# Patient Record
Sex: MALE | Race: WHITE | NOT HISPANIC OR LATINO | Employment: FULL TIME | URBAN - METROPOLITAN AREA
[De-identification: names, ages, dates, MRNs, and addresses within clinical notes are randomized per-mention and may not be internally consistent; named-entity substitution may affect disease eponyms.]

---

## 2017-01-13 ENCOUNTER — OFFICE VISIT - HEALTHEAST (OUTPATIENT)
Dept: SURGERY | Facility: CLINIC | Age: 57
End: 2017-01-13

## 2017-01-13 DIAGNOSIS — Z71.3 DIETARY COUNSELING: ICD-10-CM

## 2017-01-13 DIAGNOSIS — E66.01 OBESITY, MORBID, BMI 40.0-49.9 (H): ICD-10-CM

## 2017-01-13 ASSESSMENT — MIFFLIN-ST. JEOR: SCORE: 2510.43

## 2017-01-17 ENCOUNTER — AMBULATORY - HEALTHEAST (OUTPATIENT)
Dept: SURGERY | Facility: CLINIC | Age: 57
End: 2017-01-17

## 2017-01-31 ENCOUNTER — OFFICE VISIT - HEALTHEAST (OUTPATIENT)
Dept: SURGERY | Facility: CLINIC | Age: 57
End: 2017-01-31

## 2017-01-31 ENCOUNTER — AMBULATORY - HEALTHEAST (OUTPATIENT)
Dept: SURGERY | Facility: CLINIC | Age: 57
End: 2017-01-31

## 2017-01-31 DIAGNOSIS — G47.33 OSA (OBSTRUCTIVE SLEEP APNEA): ICD-10-CM

## 2017-01-31 DIAGNOSIS — Z98.84 BARIATRIC SURGERY STATUS: ICD-10-CM

## 2017-01-31 DIAGNOSIS — E66.01 OBESITY, CLASS III, BMI 40-49.9 (MORBID OBESITY) (H): ICD-10-CM

## 2017-01-31 DIAGNOSIS — E11.9 TYPE II DIABETES MELLITUS (H): ICD-10-CM

## 2017-01-31 RX ORDER — PEDI MULTIVIT NO.25/FOLIC ACID 300 MCG
1 TABLET,CHEWABLE ORAL 2 TIMES DAILY
Status: SHIPPED | COMMUNITY
Start: 2017-01-31

## 2017-01-31 ASSESSMENT — MIFFLIN-ST. JEOR: SCORE: 2510.43

## 2017-02-01 ENCOUNTER — COMMUNICATION - HEALTHEAST (OUTPATIENT)
Dept: SURGERY | Facility: CLINIC | Age: 57
End: 2017-02-01

## 2017-02-01 ENCOUNTER — AMBULATORY - HEALTHEAST (OUTPATIENT)
Dept: SURGERY | Facility: CLINIC | Age: 57
End: 2017-02-01

## 2017-02-14 ENCOUNTER — AMBULATORY - HEALTHEAST (OUTPATIENT)
Dept: SURGERY | Facility: CLINIC | Age: 57
End: 2017-02-14

## 2017-02-21 ENCOUNTER — AMBULATORY - HEALTHEAST (OUTPATIENT)
Dept: SURGERY | Facility: CLINIC | Age: 57
End: 2017-02-21

## 2017-02-21 ENCOUNTER — HOSPITAL ENCOUNTER (OUTPATIENT)
Dept: CARDIOLOGY | Facility: HOSPITAL | Age: 57
Discharge: HOME | End: 2017-02-21
Attending: ANESTHESIOLOGY

## 2017-02-21 ENCOUNTER — AMBULATORY - HEALTHEAST (OUTPATIENT)
Dept: CARDIOLOGY | Facility: HOSPITAL | Age: 57
End: 2017-02-21

## 2017-02-21 ENCOUNTER — RECORDS - HEALTHEAST (OUTPATIENT)
Dept: ADMINISTRATIVE | Facility: OTHER | Age: 57
End: 2017-02-21

## 2017-02-21 DIAGNOSIS — Z01.818 PRE-OP TESTING: ICD-10-CM

## 2017-02-21 LAB
ATRIAL RATE - MUSE: 78 BPM
DIASTOLIC BLOOD PRESSURE - MUSE: NORMAL MMHG
INTERPRETATION ECG - MUSE: NORMAL
P AXIS - MUSE: 51 DEGREES
PR INTERVAL - MUSE: 136 MS
QRS DURATION - MUSE: 92 MS
QT - MUSE: 416 MS
QTC - MUSE: 474 MS
R AXIS - MUSE: 33 DEGREES
SYSTOLIC BLOOD PRESSURE - MUSE: NORMAL MMHG
T AXIS - MUSE: 29 DEGREES
VENTRICULAR RATE- MUSE: 78 BPM

## 2017-04-19 ENCOUNTER — OFFICE VISIT - HEALTHEAST (OUTPATIENT)
Dept: SURGERY | Facility: CLINIC | Age: 57
End: 2017-04-19

## 2017-04-19 DIAGNOSIS — E66.01 OBESITY, MORBID, BMI 40.0-49.9 (H): ICD-10-CM

## 2017-04-19 DIAGNOSIS — Z71.3 DIETARY COUNSELING: ICD-10-CM

## 2017-04-19 ASSESSMENT — MIFFLIN-ST. JEOR: SCORE: 2539.92

## 2017-07-19 ENCOUNTER — OFFICE VISIT - HEALTHEAST (OUTPATIENT)
Dept: SURGERY | Facility: CLINIC | Age: 57
End: 2017-07-19

## 2017-07-19 DIAGNOSIS — E66.01 OBESITY, MORBID, BMI 40.0-49.9 (H): ICD-10-CM

## 2017-07-19 DIAGNOSIS — Z71.3 DIETARY COUNSELING: ICD-10-CM

## 2017-07-19 ASSESSMENT — MIFFLIN-ST. JEOR: SCORE: 2494.56

## 2017-07-25 ENCOUNTER — AMBULATORY - HEALTHEAST (OUTPATIENT)
Dept: LAB | Facility: CLINIC | Age: 57
End: 2017-07-25

## 2017-07-25 ENCOUNTER — OFFICE VISIT - HEALTHEAST (OUTPATIENT)
Dept: SURGERY | Facility: CLINIC | Age: 57
End: 2017-07-25

## 2017-07-25 DIAGNOSIS — E11.9 TYPE 2 DIABETES MELLITUS (H): ICD-10-CM

## 2017-07-25 DIAGNOSIS — G47.33 OSA (OBSTRUCTIVE SLEEP APNEA): ICD-10-CM

## 2017-07-25 DIAGNOSIS — E66.01 OBESITY, CLASS III, BMI 40-49.9 (MORBID OBESITY) (H): ICD-10-CM

## 2017-07-25 DIAGNOSIS — I10 ESSENTIAL HYPERTENSION: ICD-10-CM

## 2017-07-25 DIAGNOSIS — E11.9 TYPE II DIABETES MELLITUS (H): ICD-10-CM

## 2017-07-25 LAB — HBA1C MFR BLD: 7.4 % (ref 4.2–6.1)

## 2017-07-25 ASSESSMENT — MIFFLIN-ST. JEOR: SCORE: 2521.77

## 2017-07-31 ENCOUNTER — AMBULATORY - HEALTHEAST (OUTPATIENT)
Dept: SURGERY | Facility: CLINIC | Age: 57
End: 2017-07-31

## 2017-08-08 ENCOUNTER — COMMUNICATION - HEALTHEAST (OUTPATIENT)
Dept: SURGERY | Facility: CLINIC | Age: 57
End: 2017-08-08

## 2017-08-17 ENCOUNTER — AMBULATORY - HEALTHEAST (OUTPATIENT)
Dept: SURGERY | Facility: CLINIC | Age: 57
End: 2017-08-17

## 2017-08-17 DIAGNOSIS — E66.01 OBESITY, MORBID, BMI 40.0-49.9 (H): ICD-10-CM

## 2017-08-17 DIAGNOSIS — Z98.84 S/P BARIATRIC SURGERY: ICD-10-CM

## 2017-08-17 DIAGNOSIS — R63.4 RAPID WEIGHT LOSS: ICD-10-CM

## 2017-08-17 DIAGNOSIS — Z71.3 DIETARY COUNSELING: ICD-10-CM

## 2017-08-17 DIAGNOSIS — Z01.818 PRE-OP TESTING: ICD-10-CM

## 2017-08-17 DIAGNOSIS — Z98.84 BARIATRIC SURGERY STATUS: ICD-10-CM

## 2017-08-17 ASSESSMENT — MIFFLIN-ST. JEOR
SCORE: 2594.35
SCORE: 2594.35

## 2017-09-05 ENCOUNTER — ANESTHESIA - HEALTHEAST (OUTPATIENT)
Dept: SURGERY | Facility: CLINIC | Age: 57
End: 2017-09-05

## 2017-09-06 ENCOUNTER — SURGERY - HEALTHEAST (OUTPATIENT)
Dept: SURGERY | Facility: CLINIC | Age: 57
End: 2017-09-06

## 2017-09-06 ASSESSMENT — MIFFLIN-ST. JEOR: SCORE: 2436.15

## 2017-09-08 ENCOUNTER — COMMUNICATION - HEALTHEAST (OUTPATIENT)
Dept: SURGERY | Facility: CLINIC | Age: 57
End: 2017-09-08

## 2017-09-12 ENCOUNTER — AMBULATORY - HEALTHEAST (OUTPATIENT)
Dept: SURGERY | Facility: CLINIC | Age: 57
End: 2017-09-12

## 2017-09-12 DIAGNOSIS — Z71.3 DIETARY COUNSELING: ICD-10-CM

## 2017-09-12 DIAGNOSIS — Z98.84 BARIATRIC SURGERY STATUS: ICD-10-CM

## 2017-09-12 DIAGNOSIS — E66.01 OBESITY, MORBID, BMI 40.0-49.9 (H): ICD-10-CM

## 2017-09-19 ENCOUNTER — OFFICE VISIT - HEALTHEAST (OUTPATIENT)
Dept: SURGERY | Facility: CLINIC | Age: 57
End: 2017-09-19

## 2017-09-19 DIAGNOSIS — Z98.84 S/P GASTRIC BYPASS: ICD-10-CM

## 2017-09-19 ASSESSMENT — MIFFLIN-ST. JEOR: SCORE: 2408.37

## 2017-10-06 ENCOUNTER — COMMUNICATION - HEALTHEAST (OUTPATIENT)
Dept: SURGERY | Facility: CLINIC | Age: 57
End: 2017-10-06

## 2017-10-09 ENCOUNTER — COMMUNICATION - HEALTHEAST (OUTPATIENT)
Dept: SURGERY | Facility: CLINIC | Age: 57
End: 2017-10-09

## 2017-12-06 ENCOUNTER — OFFICE VISIT - HEALTHEAST (OUTPATIENT)
Dept: SURGERY | Facility: CLINIC | Age: 57
End: 2017-12-06

## 2017-12-06 DIAGNOSIS — Z71.3 DIETARY COUNSELING: ICD-10-CM

## 2017-12-06 DIAGNOSIS — Z98.84 BARIATRIC SURGERY STATUS: ICD-10-CM

## 2017-12-06 DIAGNOSIS — E66.01 OBESITY, MORBID, BMI 40.0-49.9 (H): ICD-10-CM

## 2017-12-06 ASSESSMENT — MIFFLIN-ST. JEOR: SCORE: 2322.19

## 2018-04-03 ENCOUNTER — AMBULATORY - HEALTHEAST (OUTPATIENT)
Dept: SURGERY | Facility: CLINIC | Age: 58
End: 2018-04-03

## 2018-04-03 DIAGNOSIS — K90.9 INTESTINAL MALABSORPTION, UNSPECIFIED TYPE: ICD-10-CM

## 2018-04-03 DIAGNOSIS — K91.2 POSTSURGICAL MALABSORPTION: ICD-10-CM

## 2018-04-03 DIAGNOSIS — Z98.84 S/P BARIATRIC SURGERY: ICD-10-CM

## 2018-04-25 ENCOUNTER — AMBULATORY - HEALTHEAST (OUTPATIENT)
Dept: LAB | Facility: CLINIC | Age: 58
End: 2018-04-25

## 2018-04-25 ENCOUNTER — OFFICE VISIT - HEALTHEAST (OUTPATIENT)
Dept: SURGERY | Facility: CLINIC | Age: 58
End: 2018-04-25

## 2018-04-25 DIAGNOSIS — E11.9 TYPE II DIABETES MELLITUS (H): ICD-10-CM

## 2018-04-25 DIAGNOSIS — K91.2 POSTOPERATIVE MALABSORPTION: ICD-10-CM

## 2018-04-25 DIAGNOSIS — M79.671 RIGHT FOOT PAIN: ICD-10-CM

## 2018-04-25 DIAGNOSIS — Z98.84 HX OF GASTRIC BYPASS: ICD-10-CM

## 2018-04-25 LAB
FERRITIN SERPL-MCNC: 85 NG/ML (ref 27–300)
FOLATE SERPL-MCNC: 17 NG/ML
MAGNESIUM SERPL-MCNC: 2.1 MG/DL (ref 1.8–2.6)
PTH-INTACT SERPL-MCNC: 108 PG/ML (ref 10–86)
VIT B12 SERPL-MCNC: >2000 PG/ML (ref 213–816)

## 2018-04-25 ASSESSMENT — MIFFLIN-ST. JEOR: SCORE: 2332.17

## 2018-04-27 LAB
C PEPTIDE SERPL-MCNC: 0.4 NG/ML (ref 0.9–6.9)
COPPER SERPL-MCNC: 91 UG/DL (ref 70–140)
ZINC SERPL-MCNC: 67 UG/DL (ref 60–120)

## 2018-04-28 LAB
ANNOTATION COMMENT IMP: NORMAL
VIT A SERPL-MCNC: 0.49 MG/L (ref 0.3–1.2)
VITAMIN A (RETINYL PALMITATE): 0.03 MG/L (ref 0–0.1)

## 2018-04-30 LAB — VIT B1 PYROPHOSHATE BLD-SCNC: 176 NMOL/L (ref 70–180)

## 2018-05-02 ENCOUNTER — COMMUNICATION - HEALTHEAST (OUTPATIENT)
Dept: SURGERY | Facility: CLINIC | Age: 58
End: 2018-05-02

## 2018-05-02 DIAGNOSIS — E21.1 SECONDARY HYPERPARATHYROIDISM, NON-RENAL (H): ICD-10-CM

## 2018-05-02 RX ORDER — ERGOCALCIFEROL 1.25 MG/1
50000 CAPSULE ORAL
Qty: 16 CAPSULE | Refills: 0 | Status: SHIPPED | OUTPATIENT
Start: 2018-05-03

## 2018-06-06 ENCOUNTER — OFFICE VISIT - HEALTHEAST (OUTPATIENT)
Dept: SURGERY | Facility: CLINIC | Age: 58
End: 2018-06-06

## 2018-06-06 DIAGNOSIS — Z98.84 BARIATRIC SURGERY STATUS: ICD-10-CM

## 2018-06-06 DIAGNOSIS — E66.01 OBESITY, MORBID, BMI 40.0-49.9 (H): ICD-10-CM

## 2018-06-06 DIAGNOSIS — Z71.3 DIETARY COUNSELING: ICD-10-CM

## 2018-06-06 ASSESSMENT — MIFFLIN-ST. JEOR: SCORE: 2322.19

## 2018-08-21 ENCOUNTER — AMBULATORY - HEALTHEAST (OUTPATIENT)
Dept: SURGERY | Facility: CLINIC | Age: 58
End: 2018-08-21

## 2018-08-21 DIAGNOSIS — K91.2 POSTSURGICAL MALABSORPTION: ICD-10-CM

## 2018-08-21 DIAGNOSIS — K90.9 INTESTINAL MALABSORPTION, UNSPECIFIED TYPE: ICD-10-CM

## 2018-08-21 DIAGNOSIS — Z98.84 S/P BARIATRIC SURGERY: ICD-10-CM

## 2021-05-29 ENCOUNTER — RECORDS - HEALTHEAST (OUTPATIENT)
Dept: ADMINISTRATIVE | Facility: CLINIC | Age: 61
End: 2021-05-29

## 2021-05-30 VITALS — WEIGHT: 315 LBS | HEIGHT: 74 IN | BODY MASS INDEX: 40.43 KG/M2

## 2021-05-30 VITALS — HEIGHT: 74 IN | BODY MASS INDEX: 40.43 KG/M2 | WEIGHT: 315 LBS

## 2021-05-30 VITALS — WEIGHT: 315 LBS | BODY MASS INDEX: 40.43 KG/M2 | HEIGHT: 74 IN

## 2021-05-31 VITALS — HEIGHT: 74 IN | WEIGHT: 315 LBS | BODY MASS INDEX: 40.43 KG/M2

## 2021-05-31 VITALS — WEIGHT: 315 LBS | BODY MASS INDEX: 40.43 KG/M2 | HEIGHT: 74 IN

## 2021-05-31 VITALS — WEIGHT: 315 LBS | HEIGHT: 74 IN | BODY MASS INDEX: 40.43 KG/M2

## 2021-05-31 VITALS — BODY MASS INDEX: 40.43 KG/M2 | HEIGHT: 74 IN | WEIGHT: 315 LBS

## 2021-06-01 ENCOUNTER — RECORDS - HEALTHEAST (OUTPATIENT)
Dept: ADMINISTRATIVE | Facility: CLINIC | Age: 61
End: 2021-06-01

## 2021-06-01 VITALS — HEIGHT: 74 IN | BODY MASS INDEX: 40.43 KG/M2 | WEIGHT: 315 LBS

## 2021-06-01 VITALS — BODY MASS INDEX: 40.43 KG/M2 | HEIGHT: 74 IN | WEIGHT: 315 LBS

## 2021-06-02 ENCOUNTER — RECORDS - HEALTHEAST (OUTPATIENT)
Dept: ADMINISTRATIVE | Facility: CLINIC | Age: 61
End: 2021-06-02

## 2021-06-08 NOTE — PROGRESS NOTES
I received a request for additional medical documentation from [employer].  I have faxed over the documentation that they requested

## 2021-06-08 NOTE — PROGRESS NOTES
Cleared by RD. Workflow updated and reviewed.    Gabriela Dunn [facility]  P: [phone]  F: [phone]

## 2021-06-08 NOTE — PROGRESS NOTES
Patricio is scheduled for a Colonoscopy procedure with Dr. Goel on 2/21/17 at [facility]. Patient was given instructions of arrival time, need a , pre-op physical within 30days and NPO after midnight. Patient verbalized understanding.     Jazmine Worrell [facility]  Physician    [facility]   [phone]

## 2021-06-08 NOTE — PROGRESS NOTES
I have submitted a prior authorization request on behalf of this patient to [employer] to be approved for a LRNY with Dr. Isaac Wade.  Copy to [facility] to scan to patients chart

## 2021-06-08 NOTE — PROGRESS NOTES
Follow Up Surgical Weight Loss Supervised Diet Evaluation  Assessment:  Pt presents for follow up supervised diet visit with RD.    This patient is a 56 y.o.  He is being seen today for follow-up nutritional evaluation. Patricio Osei has been unsuccessful with non-surgical weight loss methods and is interested in bariatric surgery. Today we reviewed the patients current eating habits and level of physical activity, and instructed on the changes that are required for successful bariatric outcomes.    Workflow review: Pt completed all other steps - just needs nutrition approval   Weight goal: At or below initial weight  +Pt will be traveling to [place] for a month in February - talked about continuing to follow habits while away and weigh ins when he comes back.    Pt's Initial Weight: 384 lbs  Weight: 359 lb 8 oz (163.1 kg)  Weight loss from initial: 24.5  % Weight loss: 6.38 %  Body mass index is 46.16 kg/(m^2).    Calculated RMR (Weaver-St Jeor equation): 2606 calories    Progress made since last visit: Pt has cut out sodas and is  fluids from meals more consistently  Concerns: Portions occasionally large - talked about snacks (hungry vs mental hunger)    Diabetes  Recent blood sugars or average blood sugar ranges: 150 normal; occasional 200 spikes (200 when he gets home with high CHO snack)  DM Meds currently taking: in chart    Diet Recall/Time:   Breakfast: 2 eggs and banana; skim milk (20g)  Am Snack: apple  Lunch: sandwich w/ deli and cheese or tuna and chung or chili and apple(23g)   Pm snack: pickles or fruit  Dinner: Pro/CHO/Veg - occasional subway 1X/week (footlong)   HS Snack: pickle or olives     Protein: 75g    Typical Snacks or snack times: pickles, fruit   Meals per week away from home: 2X/week      Recommended limiting eating out to no more than 2x/week.  Patient and I reviewed the importance of eating three consistent meals per day; as well as meal timing to be spaced 4-5 hours  apart.  Snack choices: 100-150 calories (1-2x/day if physically hungry), incorporating a fruit/vegetable w/ protein source.    Meal Duration:30 minutes  Encouraged slowing meal times down, 20-30 minutes, chewing to applesauce consistency.     Portion Sizes problematic? Yes Per patient/diet recall   To aid in proper portion control and slow meal time down discussed consuming meals off smaller plates, use toddler/children utensils and set utensils down after each bite.    Protein, vegetables/fruits, carbohydrates:   The patient and I discussed the importance of including lean/low fat protein at each meal and limiting carbohydrate intake to less than 25% of plate volume.     Vitamins   Post-op vitamin regimen: Multi Vit + iron 2x/day, calcium citrate 500-600 mg 2x/day, 3360-9633 mcg of Sublingual B-12 daily, and 5000 IU Vitamin D3 daily.    Beverages (Type/Oz. per day)  Water: 64oz  Coffee: none  Tea: none  Milk: 1cup/day  Regular soda: just once   Diet soda: none  Juice: none  Cisco-Aid/lemonade/etc: none  Alcohol: 2X over last month     Discussed the importance of adequate hydration after surgery and the goal of 64+ oz of fluid daily.   The patient understands the importance of avoiding all carbonated, caffeinated and sweetened drinks; and instead choose 64 oz plain water.    Fluid-meal separation:   Pt is working on  fluids 30min before and 30 minutes after meals.  Fluids are  30min before and 30 minutes after meals. - 75%     Exercise  2X/week bike  ADL    Pt's understands that 30-60 minutes of daily activity is an important part of bariatric surgery success.   Encouraged pt to incorporate upper body strength training exercise, even if its lifting soup cans while watching TV at night, doing pushups/sit-ups, and abdominal work.    PES statement:   1. (NC-3.3.5) Obese, class III, BMI ?40 related to physical inactivity as evidenced by Infrequent, low-duration and or low intensity physical activity;  and Large amounts of sedentary activities; no structured physical activity regimen    Intervention:  Discussion:   1. Reviewed the Keys to Bariatric Success handout.  2. Reviewed lean protein sources and recommended to consume 15-20gm protein at 3 meals daily.  3. Gave pt Check Your Understanding Quiz, and assessed readiness for change.  4. Educated on pre/post-op diet progression, post-op vitamin regimen, gave review of surgery process.    Instructions/Goals:   1. Include 15-20gm protein at each meal.  2. Increase vegetable/fruit intake, by having a vegetable or fruit with each meal daily. Recommended pt to increase vegetable/fruit intake to 4-5 servings daily.  3. Increase fluid intake to 64oz daily: choose plain or calorie/carbonation-free beverages.  4. Incorporate daily structured activity, 30-60 minutes most days of the week  5. Practice plate method: 1/2 plate lean/low fat protein source, vegetable/fruit, <25% of plate complex carbohydrates.  6. Read food labels more consistently: keeping total fat grams <10, total sugar grams <10, saturated fat <3gm per serving.  7. Separate fluids 30 minutes before/after meal times.  8. Practice eating off of smaller plates/bowls, chewing to applesauce consistency, taking 20-30 minutes to eat in a calm/relaxed environment without distractions of tv/email/cell phone.    Handouts provided:  Protein supplement list  Diet Progression/Vitamins/portion sizes    Monitor/Evaluation:     Pt understands the importance of not gaining any weight from initial recorded weight.      Has realistic expectations for weight loss: Yes  Verbalizes understanding of dietary changes post procedure: Yes  Verbalizes understanding of supplement needs: Yes  Verbalizes willingness to participate in physical activity: Yes  Motivation for change: average  Client s predicted compliance on a scale of 1 (low) to 10 (high): 8  RD s prediction for client success and compliance on a scale of 1 (low) to 10  (high):  7    Pt has made the necessary changes, and is knowledgeable and well-informed of the dietary and physical activity requirements that are necessary for successful bariatric outcomes. This Pt is an appropriate candidate for surgery from a nutrition standpoint at this time. The patient understands that surgery is a tool, and not a cure, and our aftercare program must be followed.    Time In: 2:30p  Time Out: 3:00p      ABN signed: Yes

## 2021-06-08 NOTE — PROGRESS NOTES
HPI: Patricio Osei is a 56 y.o. male here today for consideration of metabolic and bariatric surgery. He is referred by Dr. Mcclain.  He actually had a Realize-C laparoscopic adjustable gastric band placed in 2009 by Dr. Ayala.  He was able to lose about 70 pounds but unfortunately he developed significant dysphagia that was not relieved by desufflating the band.  Eventually had the band taken out which she states was about 2 years ago.  He has regained a large amount of his weight.  At one time he had been up to 385 pounds.  He states he has been able lose weight without too much difficulty, prickly when he travels to [place] but tends to regain the weight fairly easily as well.  He's been diabetic for about 10 years without complications.  He is on quite a few different medications for that and his last A1c was 7.8.  He also has a history of OTILIA but has not tolerated CPAP well.  He has a history of asthma but only uses his inhaler occasionally, usually set off by mold.  He has gastroesophageal reflux disease which is controlled with his PPI.  He also has history of hypertension and hyperlipidemia, both controlled with medications.      Allergies:Review of patient's allergies indicates no known allergies.    Past Medical History   Diagnosis Date     Arthritis      arthritis on back imaging.     Asthma      Back pain      Diabetes mellitus      type 2     GERD (gastroesophageal reflux disease)      controls well with PPI     Hyperlipidemia      Hypertension      Sleep apnea      does not use CPAP; had sleep study 4yrs ago.        Past Surgical History   Procedure Laterality Date     Laparoscopic gastric banding  06/01/2009     Dr. Ayala; removed 3/2015     Appendectomy       Lap band removal       removed March, 2016.     Carpal tunnel release Left        CURRENT MEDS:  Current Outpatient Prescriptions   Medication Sig Dispense Refill     ACCU-CHEK CHANTELLE PLUS TEST STRP strips        albuterol (PROVENTIL HFA;VENTOLIN  HFA) 90 mcg/actuation inhaler Inhale 2 puffs every 6 (six) hours as needed for wheezing.       aspirin 81 MG EC tablet Take 81 mg by mouth daily.       atorvastatin (LIPITOR) 40 MG tablet Take 1 tablet by mouth daily.       calcium citrate-vitamin D (CITRACAL+D) 315-200 mg-unit per tablet Take 1 tablet by mouth daily.       calcium-vitamin D3-vitamin K 500 mg-1,000 unit-40 mcg Chew Chew 1 tablet daily.       cyanocobalamin (VITAMIN B-12) 500 MCG tablet Take 500 mcg by mouth daily.       FARXIGA 10 mg Tab Take 1 tablet by mouth daily.       hydrochlorothiazide (HYDRODIURIL) 25 MG tablet        krill oil 500 mg cap Take 1 capsule by mouth daily.       LANTUS SOLOSTAR 100 unit/mL (3 mL) pen Inject 44 Units under the skin 2 (two) times a day.       lisinopril (PRINIVIL,ZESTRIL) 20 MG tablet        metFORMIN (GLUCOPHAGE) 1000 MG tablet        multivitamin therapeutic (THERAGRAN) tablet Take 1 tablet by mouth daily.       NOVOLOG FLEXPEN 100 unit/mL injection pen Inject 40 Units under the skin 4 (four) times a day.        omeprazole (PRILOSEC) 20 MG capsule        ped multivit #43-iron fumarate (FLINTSTONES COMPLETE, IRON,) 18 mg iron Chew Chew 1 tablet daily.       simvastatin (ZOCOR) 20 MG tablet        VICTOZA 3-ALEXANDER 0.6 mg/0.1 mL (18 mg/3 mL) PnIj injection        No current facility-administered medications for this visit.          Family History   Problem Relation Age of Onset     Rectal cancer Mother      No Medical Problems Brother      Lupus Sister         reports that he has never smoked. He has never used smokeless tobacco. He reports that he drinks alcohol. He reports that he does not use illicit drugs.    Review of Systems -  A 12 point ROS was reviewed and except for what is listed in the HPI above, all others are negative  PSYCHIATRIC: He has undergone a lifestyle assessment and has been deemed a good candidate for bariatric surgery by the psychologist.    Visit Vitals     /70 (Patient Site: Left Arm,  "Patient Position: Sitting, Cuff Size: Adult Large)     Pulse 74     Temp 97.9  F (36.6  C) (Oral)     Resp 20     Ht 6' 2\" (1.88 m)     Wt (!) 359 lb 8 oz (163.1 kg)     SpO2 100%     BMI 46.16 kg/m2     Wt Readings from Last 3 Encounters:   01/31/17 (!) 359 lb 8 oz (163.1 kg)   01/13/17 (!) 359 lb 8 oz (163.1 kg)   12/16/16 (!) 355 lb (161 kg)     Body mass index is 46.16 kg/(m^2).    EXAM:  GENERAL: This is a well-developed 56 y.o. male who appears his stated age  HEAD & NECK: Grossly normal.  No palpable thyroid lesions  CARDIAC: RRR without murmur  CHEST/LUNG:  Clear to auscultation  ABDOMEN: Obese.  Nontender.  No hernias or masses appreciated.  He has well-healed incisions consistent with previous lap band.  LYMPHATIC:  No significant adenopathy appreciated.    EXTREMITIES: Grossly normal.  No evidence of chronic venous stasis.    NEUROLOGIC: Focally intact  INTEGUMENT: No open lesions or ulcers  PSYCHIATRIC: Normal affect. He has a good grasp on the nature of his obesity and the treatment options.    LABS:  No results found for: WBC, HGB, HCT, MCV, PLT  INR/Prothrombin Time      No results found for: HGBA1C  No results found for: ALT, AST, GGT, ALKPHOS, BILITOT    Assessment/Plan: 56 y.o. male who has had a laparoscopic adjustable gastric band in the past which was problematic and has been removed.  He continues with significant morbid obesity as well as multiple comorbid conditions and would be an excellent candidate for revisional surgery.  I discussed with him that this is revisional surgery and that I will encounter scar tissue from his previous operation.  I also strongly recommended that we proceed with a Mike-en-Y gastric bypass, and after discussion he agreed with this as well.       I went over the surgery in detail with him.  I went over the nature of the operation and some of the potential consequences of the surgery.  I went over the expected hospital course and discussed laparoscopic versus open " surgery, understanding that we will plan on doing this laparoscopically with the possibility of having to convert to an open operation.  I went over some of the risks and complications of the operation including, but not limited to, DVT, pulmonary emboli, pneumonia, postoperative bleeding, wound infection, staple line leak, intra-abdominal sepsis, and possible death.  I also went over some of the potential nutritional concerns such as vitamin B-12, iron, vitamin D, vitamin A, calcium and protein deficiencies.  I will also went over the need for lifelong nutritional surveillance.  The patient understands and wants to proceed with surgery.  We will submit for prior authorization.      Isaac Wade MD  [facility] Department of Surgery

## 2021-06-10 NOTE — PROGRESS NOTES
Follow Up Surgical Weight Loss Supervised Diet Evaluation  Assessment:  Pt presents for follow up supervised diet visit with NAMRATA.    This patient is a 57 y.o.  He is being seen today for follow-up nutritional evaluation. Patricio Osei has been unsuccessful with non-surgical weight loss methods and is interested in bariatric surgery. Today we reviewed the patients current eating habits and level of physical activity, and instructed on the changes that are required for successful bariatric outcomes.    ++pt was passed from nutrition but now unsure of protein choices - reviewed and talked through nutrition habits. Has trip to [place] and has an appointment after to make sure he is still on track - will plan to have surgery June/July if he is on track after trips    Workflow review: Pt has completed labs, HP calls, attended support group and been approved by psych   Weight goal: At or below initial weight    Pt's Initial Weight: 384 lbs  Weight: 366 lb (166 kg)  Weight loss from initial: 18  % Weight loss: 4.69 %  Body mass index is 46.99 kg/(m^2).    Calculated RMR (Stanislaus-St Jeor equation): 2606 calories    Progress made since last visit: Pt is continuing to try and separate fluids from meals, getting enough water throughout the day and is getting enough protein throughout the day  Concerns: Pt is not exercising currently, drinking 1/2 can soda/day, states portions can be large still and eating pizza weekly    Diabetes  Testing Blood Sugars: yes, 100-140  Medications: in chart  7.0%    Diet Recall/Time:   Breakfast:  2 eggs, banana and 1/2 can coke or diet coke OR propel (14g)   Am Snack: oatmeal packet   Lunch: 1 can chili OR tuna sandwich and apple (23g)   Pm snack: 3pm - fruit   Dinner: Pro/Veg/CHO  And pizza night weekly (garbage pizza 6-8 squares) (28g)  HS Snack: pickles - not physically hungry   - states weekends are challenging   Protein: 65g    Typical Snacks or snack times: oatmeal, fruit  Meals per week  away from home: 1-3     Recommended limiting eating out to no more than 2x/week.  Patient and I reviewed the importance of eating three consistent meals per day; as well as meal timing to be spaced 4-5 hours apart.  Snack choices: 100-150 calories (1-2x/day if physically hungry), incorporating a fruit/vegetable w/ protein source.    Meal Duration:30 minutes  Encouraged slowing meal times down, 20-30 minutes, chewing to applesauce consistency.     Portion Sizes problematic? Yes Per patient/diet recall   To aid in proper portion control and slow meal time down discussed consuming meals off smaller plates, use toddler/children utensils and set utensils down after each bite.    Protein, vegetables/fruits, carbohydrates:   The patient and I discussed the importance of including lean/low fat protein at each meal and limiting carbohydrate intake to less than 25% of plate volume.     Vitamins   Post-op vitamin regimen: Multi Vit + iron 2x/day, calcium citrate 500-600 mg 2x/day, 1550-3350 mcg of Sublingual B-12 daily, and 5000 IU Vitamin D3 daily.    Beverages (Type/Oz. per day)  Water: 1 gallon  Coffee: none  Tea: none  Milk: 1-2X/week  Regular soda: none  Diet soda: 1/2 can/day  Juice: none  Cisco-Aid/lemonade/etc: none  Alcohol: 1-2 beers/week     Discussed the importance of adequate hydration after surgery and the goal of 64+ oz of fluid daily.   The patient understands the importance of avoiding all carbonated, caffeinated and sweetened drinks; and instead choose 64 oz plain water.    Fluid-meal separation:   Pt is working on  fluids 30min before and 30 minutes after meals.  Fluids are  30min before and 30 minutes after meals. 65%    Exercise  Working outside   Knee pain - rested   Thinking about biking again    Pt's understands that 30-60 minutes of daily activity is an important part of bariatric surgery success.   Encouraged pt to incorporate upper body strength training exercise, even if its  lifting soup cans while watching TV at night, doing pushups/sit-ups, and abdominal work.    PES statement:   1.   (NC-3.3.5) Obese, class III, BMI ?40 related to physical inactivity as evidenced by Infrequent, low-duration and or low intensity physical activity; and Large amounts of sedentary activities; no structured physical activity regimen     Intervention:  Discussion:   1. Reviewed the Keys to Bariatric Success handout.  2. Reviewed lean protein sources and recommended to consume 15-20gm protein at 3 meals daily.    GOAL:  1) Pt will make it a priority to have balanced meals for bfast, lunch and dinner   2) Pt will bring protein snacks in case there is not a good protein option around    Handouts provided:  Lean Protein sources    Monitor/Evaluation:     Pt understands the importance of not gaining any weight from initial recorded weight.    Has realistic expectations for weight loss: Yes  Verbalizes understanding of dietary changes post procedure: Yes  Verbalizes understanding of supplement needs: Yes  Verbalizes willingness to participate in physical activity: Yes  Motivation for change: average  RD s prediction for client success and compliance on a scale of 1 (low) to 10 (high):  6    Plan for next visit:   Review goals    Time In: 9:30a  Time Out: 10:00a    ABN signed: Yes

## 2021-06-11 NOTE — PROGRESS NOTES
Follow Up Surgical Weight Loss Supervised Diet Evaluation  Assessment:  Pt presents for follow up supervised diet visit with NAMRATA.    This patient is a 57 y.o.  He is being seen today for follow-up nutritional evaluation. Patricio Osei has been unsuccessful with non-surgical weight loss methods and is interested in bariatric surgery. Today we reviewed the patients current eating habits and level of physical activity, and instructed on the changes that are required for successful bariatric outcomes.    Workflow review: Pt has completed HP call and labs, attended support group and has been cleared by psych   Weight goal: At or below initial weight    Pt's Initial Weight: 384 lbs  Weight: 356 lb (161.5 kg)  Weight loss from initial: 28  % Weight loss: 7.29 %  Body mass index is 45.71 kg/(m^2).    Calculated RMR (Gadsden-St Jeor equation): 2606 calories    Progress made since last visit: Pt is continuing to follow the [place] to Success - drinking enough fluids and getting enough protein; he is walking more throughout his day and chewing to applesauce consistency  Concerns: In the weeks before surgery pt will need to continuing to separate fluids from meals    Diabetes  Testing Blood Sugars: 109-157  Medications: in chart  Last A1C 7.0%    Diet Recall/Time: wakes at 440am  Breakfast: 5am - 2 eggs and banana (14g)   Am Snack: occasional tuna sandwich (machine) (15g)   Lunch: salad w/ protein (machine) (20g)  Pm snack: occasional fruit  Dinner: Pro/Veg/CHO (28g)   HS Snack: pickle    Protein: 77g    Typical Snacks or snack times: tuna sandwich or fruit  Meals per week away from home: eating out of a machine daily currently - will start to grocery shop now that he is back in town    Recommended limiting eating out to no more than 2x/week.  Patient and I reviewed the importance of eating three consistent meals per day; as well as meal timing to be spaced 4-5 hours apart.  Snack choices: 100-150 calories (1-2x/day if  physically hungry), incorporating a fruit/vegetable w/ protein source.    Meal Duration:30 minutes  Encouraged slowing meal times down, 20-30 minutes, chewing to applesauce consistency.     Portion Sizes problematic? No Per patient/diet recall   To aid in proper portion control and slow meal time down discussed consuming meals off smaller plates, use toddler/children utensils and set utensils down after each bite.    Protein, vegetables/fruits, carbohydrates:   The patient and I discussed the importance of including lean/low fat protein at each meal and limiting carbohydrate intake to less than 25% of plate volume.     Vitamins   Post-op vitamin regimen: Multi Vit + iron 2x/day, calcium citrate 500-600 mg 2x/day, 8208-9636 mcg of Sublingual B-12 daily, and 5000 IU Vitamin D3 daily.    Beverages (Type/Oz. per day)  Water: at least 64oz  Coffee: none  Tea: none  Milk: 1 glass 1X/day  Regular soda: none  Diet soda: none  Juice: none  Cisco-Aid/lemonade/etc: none  Alcohol: 5beers/month    Discussed the importance of adequate hydration after surgery and the goal of 64+ oz of fluid daily.   The patient understands the importance of avoiding all carbonated, caffeinated and sweetened drinks; and instead choose 64 oz plain water.    Fluid-meal separation:   Pt is working on  fluids 30min before and 30 minutes after meals.  Fluids are  30min before and 30 minutes after meals. - 60%    Exercise  Walking 10,000steps per day     Pt's understands that 30-60 minutes of daily activity is an important part of bariatric surgery success.   Encouraged pt to incorporate upper body strength training exercise, even if its lifting soup cans while watching TV at night, doing pushups/sit-ups, and abdominal work.    PES statement:   1. (NC-3.3.5) Obese, class III, BMI ?40 related to physical inactivity as evidenced by Infrequent, low-duration and or low intensity physical activity; and Large amounts of sedentary activities;  no structured physical activity regimen    Intervention:  Discussion:   1. Reviewed the Keys to Bariatric Success handout.  2. Reviewed lean protein sources and recommended to consume 15-20gm protein at 3 meals daily.  3. Gave pt Check Your Understanding Quiz, and assessed readiness for change.  4. Educated on pre/post-op diet progression, post-op vitamin regimen, gave review of surgery process.    Instructions/Goals:   1. Include 15-20gm protein at each meal.  2. Increase vegetable/fruit intake, by having a vegetable or fruit with each meal daily. Recommended pt to increase vegetable/fruit intake to 4-5 servings daily.  3. Increase fluid intake to 64oz daily: choose plain or calorie/carbonation-free beverages.  4. Incorporate daily structured activity, 30-60 minutes most days of the week  5. Practice plate method: 1/2 plate lean/low fat protein source, vegetable/fruit, <25% of plate complex carbohydrates.  6. Read food labels more consistently: keeping total fat grams <10, total sugar grams <10, fiber >3gm per serving.  7. Separate fluids 30 minutes before/after meal times.  8. Practice eating off of smaller plates/bowls, chewing to applesauce consistency, taking 20-30 minutes to eat in a calm/relaxed environment without distractions of tv/email/cell phone.    Handouts provided:  Protein supplement list  Diet Progression/Vitamins/portion sizes    Monitor/Evaluation:     Pt understands the importance of not gaining any weight from initial recorded weight.    Has realistic expectations for weight loss: Yes  Verbalizes understanding of dietary changes post procedure: Yes  Verbalizes understanding of supplement needs: Yes  Verbalizes willingness to participate in physical activity: Yes  Motivation for change: average  Client s predicted compliance on a scale of 1 (low) to 10 (high): 9  RD s prediction for client success and compliance on a scale of 1 (low) to 10 (high):  8    Pt has made the necessary changes, and is  knowledgeable and well-informed of the dietary and physical activity requirements that are necessary for successful bariatric outcomes. This Pt is an appropriate candidate for surgery from a nutrition standpoint at this time. The patient understands that surgery is a tool, and not a cure, and our aftercare program must be followed.    Time In: 9:00a  Time Out: 9:30a    ABN signed: Yes

## 2021-06-12 NOTE — PROGRESS NOTES
HPI: This is a patient who I saw about 6 months ago for consideration of bariatric and metabolic surgery.  Patient has quite a bit of traveling and has been in [place] for the last few months and is now back.  He does not need to be back in [place] until January 2018.  This would now probably be a good window of opportunity to have his bariatric and metabolic surgery.  At his last visit I went over quite a bit of detail with him about the surgery and we have decided on a Mike-en-Y gastric bypass.  There has been no significant changes in his health.  He states his diabetes has been under good control.  He will have an A1c drawn the day.    CURRENT MEDS:  Current Outpatient Prescriptions   Medication Sig Dispense Refill     ACCU-CHEK CHANTELLE PLUS TEST STRP strips        albuterol (PROVENTIL HFA;VENTOLIN HFA) 90 mcg/actuation inhaler Inhale 2 puffs every 6 (six) hours as needed for wheezing.       aspirin 325 MG tablet Take 325 mg by mouth daily.       cyanocobalamin (VITAMIN B-12) 500 MCG tablet Take 500 mcg by mouth daily.       FARXIGA 10 mg Tab Take 1 tablet by mouth daily.       hydrochlorothiazide (HYDRODIURIL) 25 MG tablet Take 12.5 mg by mouth daily.        krill oil 500 mg cap Take 1 capsule by mouth daily.       LANTUS SOLOSTAR 100 unit/mL (3 mL) pen Inject 44 Units under the skin 2 (two) times a day.       lisinopril (PRINIVIL,ZESTRIL) 20 MG tablet        metFORMIN (GLUCOPHAGE) 1000 MG tablet Take 1,000 mg by mouth 2 (two) times a day with meals.        NOVOLOG FLEXPEN 100 unit/mL injection pen Inject 40 Units under the skin 3 (three) times a day before meals.        omeprazole (PRILOSEC) 20 MG capsule        ped multivit #43-iron fumarate (FLINTSTONES COMPLETE, IRON,) 18 mg iron Chew Chew 1 tablet daily.       simvastatin (ZOCOR) 20 MG tablet Take 20 mg by mouth at bedtime.        VICTOZA 3-ALEXANDER 0.6 mg/0.1 mL (18 mg/3 mL) PnIj injection Inject 1.8 mg under the skin daily.        No current facility-administered  "medications for this visit.          /76  Pulse 75  Resp 18  Ht 6' 2\" (1.88 m)  Wt (!) 362 lb (164.2 kg)  BMI 46.48 kg/m2  Wt Readings from Last 1 Encounters:   07/25/17 (!) 362 lb (164.2 kg)     Body mass index is 46.48 kg/(m^2).    EXAM:  GENERAL: Appears well    Assessment/Plan: Patient who is now here to get his laparoscopic Mike-en-Y gastric bypass scheduled.  I did go over the surgery again with him.  I ran down the expected hospital course as well as some of the risks involved with surgery.  I reemphasized again that those risks include but are not limited to DVT, pneumonia, pulmonary embolus, bleeding, wound infection and staple line leak.  We will plan on submitting for final prior authorization and move forward with his gastric bypass.  Spent 15 minutes in counseling with the patient at this visit.  Counseling was majority of this visit.     Isaac Wade MD  [facility] Department of Surgery  "

## 2021-06-12 NOTE — ANESTHESIA PREPROCEDURE EVALUATION
Anesthesia Evaluation      Patient summary reviewed   No history of anesthetic complications     Airway   Mallampati: I  Neck ROM: full   Pulmonary - normal exam   (+) asthma  mild, sleep apnea on no CPAP, mild,                          Cardiovascular - normal exam  Exercise tolerance: > or = 4 METS  (+) hypertension well controlled, , hypercholesterolemia,     ECG reviewed        Neuro/Psych    (+) neuromuscular disease,  chronic pain    Endo/Other    (+) diabetes mellitus type 2 using insulin, arthritis, obesity (morbid),      GI/Hepatic/Renal    (+) GERD well controlled,             Dental - normal exam                        Anesthesia Plan  Planned anesthetic: general endotracheal  50 mg ketamine IV on induction.    ASA 3   Induction: intravenous   Anesthetic plan and risks discussed with: patient and spouse  Anesthesia plan special considerations: antiemetics,   Post-op plan: routine recovery

## 2021-06-12 NOTE — PROGRESS NOTES
Workflow updated with colonoscopy.    Nellie Marcano RN, N  [facility]  P [phone]  F [phone]

## 2021-06-12 NOTE — ANESTHESIA CARE TRANSFER NOTE
Last vitals:   Vitals:    09/06/17 0937   BP: 174/84   Pulse: 72   Resp: 16   Temp: 36.6  C (97.8  F)   SpO2: 95%     Patient's level of consciousness is drowsy  Spontaneous respirations: yes  Maintains airway independently: yes  Dentition unchanged: yes  Oropharynx: oropharynx clear of all foreign objects    QCDR Measures:  ASA# 20 - Surgical Safety Checklist: WHO surgical safety checklist completed prior to induction  PQRS# 430 - Adult PONV Prevention: 4558F - Pt received => 2 anti-emetic agents (different classes) preop & intraop  ASA# 8 - Peds PONV Prevention: NA - Not pediatric patient, not GA or 2 or more risk factors NOT present  PQRS# 424 - Quita-op Temp Management: 4559F - At least one body temp DOCUMENTED => 35.5C or 95.9F within required timeframe  PQRS# 426 - PACU Transfer Protocol: - Transfer of care checklist used  ASA# 14 - Acute Post-op Pain: ASA14B - Patient did NOT experience pain >= 7 out of 10

## 2021-06-12 NOTE — PROGRESS NOTES
Time in: 10:00/Time out: 11:00      Pt presents for 1 week post op dietitian follow up. Reports tolerating full liquids well. Denies n/v, constipation/diarrhea, or significant pain. Taking MVI and SL B12 appropriately. Taking adequate fluids/day. Educated pt on pureed and soft to bariatric regular diets. Provided grocery list and sample meal plan for each diet stage.    Pt will begin pureed diet on 9/13/2017 and advance as tolerated to softs/bariatric regular on 9/27/2017. Instructed pt to begin 500 mg calcium citrate BID and 5000IU Vitamin D3 when starting the soft/regular bariatric diet phase. Pt to follow up with RD at 3 months post op.

## 2021-06-12 NOTE — ANESTHESIA POSTPROCEDURE EVALUATION
Patient: Patricio Osei  LAPAROSCOPIC DELANEY-EN-Y GASTRIC BYPASS  Anesthesia type: general    Patient location: PACU  Last vitals:   Vitals:    09/06/17 1030   BP: 169/85   Pulse: 67   Resp: 17   Temp: 36.7  C (98  F)   SpO2: 95%     Post vital signs: stable  Level of consciousness: awake and responds to simple questions  Post-anesthesia pain: pain controlled  Post-anesthesia nausea and vomiting: no  Pulmonary: unassisted, nasal cannula  Cardiovascular: stable and blood pressure at baseline  Hydration: adequate  Anesthetic events: no    QCDR Measures:  ASA# 11 - Quita-op Cardiac Arrest: ASA11B - Patient did NOT experience unanticipated cardiac arrest  ASA# 12 - Quita-op Mortality Rate: ASA12B - Patient did NOT die  ASA# 13 - PACU Re-Intubation Rate: ASA13B - Patient did NOT require a new airway mgmt  ASA# 10 - Composite Anes Safety: ASA10A - No serious adverse event    Additional Notes:

## 2021-06-12 NOTE — PROGRESS NOTES
I have submitted a prior authorization request on behalf of this patient to "[employer]" to be approved for a LRNY with Dr. Isaac Wade.  Copy to Sota to scan to patients chart

## 2021-06-12 NOTE — PROGRESS NOTES
Here for AB consult, is interested in RNY.  See flowsheet.  Workflow reviewed and updated.  Consent for surgery given to pt and questions answered.  A1C ordered as pt is due for his every 3 months check, last 7.0 on 3/21/2017.    Nellie Marcano RN, N  [facility]  P [phone]  F [phone]

## 2021-06-12 NOTE — PROGRESS NOTES
Time in: 1:00 /Time out: 2:00  .  Initial Weight: 384 lbs  Weight: 378 lb (171.5 kg)  Weight loss from initial: 6  % Weight loss: 1.56 %    Weight goal: At or below initial weight    Pt presents for nutrition education class for liquid diets. Educated pt on 2 week preop and 1 week post op liquid diets. Discussed appropriate liquids and demonstrated portions for each of the food groupings during each diet phase. Reviewed appropriate calories/protein/fluid goals during 2 week pre-op liquid diet. Educated on correct vitamins/minerals to take after surgery in correct dosage and frequency. Provided grocery list and sample menu plan for each diet stage, as well as unflavored protein powder samples.  Instructed pt to adjust diabetes medications as follows:    Stop Oral Medications/Insulin when beginning the 2 week pre-op liquid diet:  Novolog  Continue to use these for diabetes management:  Metformin: last dose 9/3  Lantus: 1/2 dose until 9/4  Victoza: last dose 9/4    Pt will begin 2 week preop liquid diet 8/23/2017, will do clear liquids the day before surgery. Pt is scheduled for RNY on 9/6/2017, and will then follow one week  post op liquid diet. Pt  will f/u with RD 1 week post op for further diet advancement.

## 2021-06-14 NOTE — PROGRESS NOTES
"Post-op Surgical Weight Loss Diet Evaluation     Assessment:  Pt presents for 3 months post-op RD visit, s/p RNY on 9/6/2017 with Dr. Wade. Today we reviewed current eating habits and level of physical activity, and instructed on the changes that are required for successful bariatric outcomes.    +pt will be traveling to [email or web address] for work 1/25 for 2 months - discussed options; 6-month post appt will be delayed  Patient Progress: Pt happy with weight loss results; no questions or concerns  - pt doing great on water intake     Pt's Initial Weight: 384 lbs  Weight: 318 lb (144.2 kg)  Weight loss from initial: 66  % Weight loss: 17.19 %    Body mass index is 40.83 kg/(m^2).     Concerns: Pt is snacking frequently on CHO snacks - discussed meal pattern and snack throughout the day; Meal sizes are also sydnee for post-op - discussed meal sizes would no increase from here    Diabetes: A1C drawn but no results back yet    Vitamins   Multi Vit with Iron: yes  Calcium Citrate: yes  B12: yes  D3: no    Do you experience hunger? No  Do you have \"dumping\" syndrome? A few occasions    With what foods: ice cream   Nausea: no  Vomiting: no  Diarrhea: no  Constipation: no  Hair loss:no    Diet Recall/Time: wakes at 430am  Breakfast: Premier protein (30g)  Am Snack: cheez its (handful) and coconut water (5kcal)   Lunch: 1 cup chili OR corn beef and cabage OR mashed potato cup w/ cheese (21g)   Pm snack: popcorn   Dinner: 4 squares pizza OR chili OR Pro/Veg/CHO (15g)    HS Snack: occasional 1- 2 canapers (chocolate cookie)     Typical Snacks: above    Proteins/Veg/Fruits/CHO (NOT well tolerated): dry meats, hamburger, fruit     Estimated protein intake:  65 grams    Estimated portion size per meals: 3/4-1 cup/meal    Incorporation of vegetables, fruits, carbohydrates into diet/meals using   Bariatric \"Plate Method\"   The patient and I discussed the importance of including lean/low fat protein at each meal, including a vegetable/fruit, " "and limiting carbohydrate intake to less than 25% of plate volume. Always keeping within approved perimeters of post op meal portion sizes according to 3 months post op guidelines.    Meals per week away from home: 1  Recommended limiting eating out to no more than 2x/week.    Meal Duration:30 minutes  Encouraged slowing meal times down, 20-30 minutes, chewing to applesauce consistency.     Fluid-meal separation:  Fluids are  30min before and 30 minutes after meals.  The patient and I reviewed the anatomy of the bypass and why  fluids from a meal is so important.    Fluid Intake  Water: 64oz plus   Caffeine: none  Alcohol: drink every 2 weeks  Carbonation: beer  Milk: none  Juice: none    Discussed the importance of adequate hydration after surgery and the goal of 64+ oz of fluid daily.   The patient understands the importance of avoiding all carbonated, caffeinated, and sweetened drinks; and instead choosing 64oz plain water.    Exercise  ADL     Pt's understands that 30-60 minutes of daily activity is an important part of bariatric surgery success.   Encouraged pt to incorporate strength training exercise along with cardiovascular exercise as well, most days of the week.      PES statement:    1. (NC-1.4) Altered GI Function related to Alteration in gastrointestinal tract structure and/or function/ Decreased functional length of the GI tract as evidenced by Weight loss of 17.19% initial body weight; Gastric bypass surgery    Intervention    Discussion  1. Discussed 3 months  Post-Op Nutritional Guidelines for RNY  2. Recommended to consume 15-20gm protein at 3 meals daily, along with protein supplement/\"planned protein containing snack\" of 15-30gm protein, to reach goal of 60-80 gm protein daily.  3. Educated on post-op vitamin regimen: Multi Vit + iron 2x/day, calcium citrate 400-600 mg 2x/day, 1270-6989 mcg of Sublingual B-12 daily, and 5000 IU Vitamin D3 daily (MVI and calcium can be taken at " "the same time BID)  4. Reviewed lean protein sources  5. Bariatric Plate Method-  including lean/low fat protein at each meal, including a vegetable/fruit, and limiting carbohydrate intake to less than 25% of plate volume. Approved perimeters of post op meal portion sizes according to 3 months post op guidelines.  Instructions  1. Include 15-20gm protein at each meal, along with protein supplement/\"planned protein containing snack\" of 15-30gm protein, to reach goal of 60-80 gm protein daily.  2. Increase fluid intake to 64oz daily: choose plain or calorie/carbonation-free beverages.  3. Incorporate daily structured activity, 30-60 minutes most days of the week  4. Recommended pt to start taking: Multi Vit + iron 2x/day, calcium citrate 400-600 mg 2x/day, 0050-2408 mcg of Sublingual B-12 daily, and 5000 IU Vitamin D3 daily. (MVI and calcium can be taken at the same time)  5. Read food labels more consistently: keeping total fat grams <10, total sugar grams <10, fiber >3gm per serving.  6. Increase vegetable/fruit intake, by having a vegetable or fruit with each meal daily.  7. Practice plate method: 1/2 plate lean/low fat protein source, vegetable/fruit, <25% of plate complex carbohydrates.  8. Separate fluids 30 minutes before/after meal times.  9. Practice eating off of smaller plates/bowls, chewing to applesauce consistency, taking 20-30 minutes to eat in a calm/relaxed environment without distractions of tv/email/cell phone.    Handouts provided:  3 months  Post-Op Nutritional Guidelines for RNY  Unjury Sample    Monitor/Evaluation    Pt to follow up for 6 months  post-op visit with bariatrician     Time In: 10:00a  Time Out: 10:30a    ABN signed: Yes      "

## 2021-06-16 PROBLEM — Z98.84 S/P GASTRIC BYPASS: Status: ACTIVE | Noted: 2017-09-06

## 2021-06-17 NOTE — PROGRESS NOTES
6 mos post op lab orders placed for patient in preparation for appointment with  in April.  A1C not ordered as pt had it drawn 2/1/18 at  and it was 7.5.    Nellie Marcano RN, N  [facility]  P [phone]  F [phone]

## 2021-06-17 NOTE — PROGRESS NOTES
[facility]ariatric Care Clinic Follow Up Visit for Previous Bariatric Surgery   Date of visit: 4/25/2018  Physician: Asaf Adams MD  Primary Care is Felix Mcclain.  Patricio Osei   58 y.o.  male    Date of Surgery: 9/6/17  Initial Weight: 384 lbs  Initial BMI: na  Today's Weight:   Wt Readings from Last 1 Encounters:   04/25/18 (!) 320 lb 3.2 oz (145.2 kg)     Body mass index is 41.11 kg/(m^2).  Initial Weight: 384 lbs  Weight: 320 lb 3.2 oz (145.2 kg)  Weight loss from initial: 63.8  % Weight loss: 16.61 %     Assessment and Plan   Assessment: Patricio is a 58 y.o. year old male who is 6-7 months s/p  Mike en Y Gastric Bypass with Dr. Wade.  He has had a durable weight loss of 64 lbs since surgery.  Overall compliance with the [facility] Program has been poor in both his activity goals and dietary compliance.  He's using most vitamins properly but may not be getting adequate vitamin D either. His labs are still pending but he plans to get them drawn today.    His Travel to [place] put a strain on his access to food and he was relying on candy bars and Propel water for many breakfast.      His diabetes really hasn't responded well to surgery and it's unclear if it's related to his food intake or if he still makes adequate insulin on his own. We'll recheck his c-peptide level with his labs. If above 2, with diet change he should be able to come off insulin products if following good bariatric technique or at least reduce his needs greatly.  The more insulin he remains on the bigger the struggle to control his weight/eating will be.  .    He's been  Limited in his walking due to recurrent right foot pain off and on the last year, he plans to follow up with his PCP for further investigation. My examination today was fairly normal apart from pes planus anatomy. No sores/blisters and just mild ankle edema.       Patricio Osei feels that he has achieved his   preoperative goals for bariatric surgery,  "despite being at the weight he was at his 3 month visit, he doesn't seemed troubled or motivated for much change.    Plan:  1. Welcome back we'll have you schedule a visit with our dietician to help with the diet and improve your blood sugar and get back on track with weight loss.  2. Keep an eye on your blood sugars with change in diet, if morning fasting sugars are below 95 you may need to ease of 10-20% from your long lasting insulin. If below 80mg/dl cut long lasting insulin in half. If above 150 mg/dl you should add 5-10 units.    3. Get labs done anytime and I'll call you with the results.  4. Exercising is vital, to help your foot pain, follow up with Dr. Mcclain for xrays and potential referral to orthopedics. Ice/elevation and compression with a 4 inch ace wrap or ankle brace is always helpful for pain after sprains.    5. Decrease your aspirin to 81mg, and look for \"enteric coated\" variety,\" EC\".    1. Type II diabetes mellitus  C-Peptide   2. Right foot pain     3. Postoperative malabsorption  Parathyroid Hormone Intact    Vitamin A (Retinol), Serum or Plasma    Vitamin B12    Vitamin B1 (Thiamine), Whole Blood (VIT B1 WB)    Ferritin    Copper, Serum or Plasma    Zinc, Serum or Plasma    Magnesium    Folate, Serum    Amb Referral to Bariatric Dietician   4. Hx of gastric bypass         Return in about 6 months (around 10/25/2018).     Bariatric Surgery Review   Interim History/LifeChanges:  Was in [place] from Feb 23rd up until recently.    Patient Concerns: no regular issues, still using insulin products.    Medication changes: now on something other than Lantus, ?Basaglar.    Appetite (1-10): 5    GERD sx at all? On PPI    Vitamin Intake:   Multivitamin   Flintstones complete w/ iron BID   Vitamin D  d3   Calcium  yes    Vit. B-12    daily B12     Habits:            Alcohol Intake  rarely.   NSAID Use  325 mg he thinks.   Caffeine Use  rarely   Exercise  walking, non   CPAP Use:  not needing.   Birth " "Control  na                                            LABS: \"Reviewed      LABS:  No results found for: WBC, HGB, HCT, MCV, PLT   No results found for: TEANCBSH87LX Lab Results   Component Value Date    HGBA1C 7.4 (H) 07/25/2017      No results found for: CHOL No results found for: PTH      No results found for: FERRITIN   No results found for: HDL   No results found for: ZOZXGJUI53 No results found for: 16409   No results found for: LDLCALC No results found for: TSH No results found for: FOLATE   No results found for: TRIG No results found for: ALT, AST, GGT, ALKPHOS, BILITOT No results found for: TESTOSTERONE     No components found for: CHOLHDL No results found for: 7597   @resusfast(vitamin a: 1)@          Patient Profile   Social History     Social History Narrative        Past Medical History   Past Medical History:   Diagnosis Date     Arthritis     arthritis on back imaging.     Asthma      Back pain      Carpal tunnel syndrome      Diabetes mellitus     type 2     GERD (gastroesophageal reflux disease)     controls well with PPI     Hyperlipidemia      Hypertension      Morbid obesity      Sleep apnea     does not use CPAP; had sleep study 4yrs ago.      Patient Active Problem List   Diagnosis     Obesity, Class III, BMI 40-49.9 (morbid obesity)     Type II diabetes mellitus     Hypertension     Erectile dysfunction     OTILIA (obstructive sleep apnea)     S/P gastric bypass     S/P bariatric surgery     Current Outpatient Prescriptions   Medication Sig     ACCU-CHEK CHANTELLE PLUS TEST STRP strips      albuterol (PROVENTIL HFA;VENTOLIN HFA) 90 mcg/actuation inhaler Inhale 1-2 puffs every 6 (six) hours as needed for wheezing or shortness of breath.      atorvastatin (LIPITOR) 40 MG tablet Take 40 mg by mouth daily.     cyanocobalamin, vitamin B-12, 1,000 mcg Subl Place 1,000 mcg under the tongue daily.     LANTUS SOLOSTAR 100 unit/mL (3 mL) pen Inject 20 Units under the skin at bedtime. (Patient taking " "differently: Inject 44 Units under the skin at bedtime. )     lisinopril (PRINIVIL,ZESTRIL) 20 MG tablet Take 20 mg by mouth daily.      metFORMIN (GLUCOPHAGE) 1000 MG tablet Take 1 tablet (1,000 mg total) by mouth 2 (two) times a day with meals.     NOVOLOG FLEXPEN 100 unit/mL injection pen Subcutaneous, 3 times daily with meals. Correction Insulin.     omeprazole (PRILOSEC) 20 MG capsule Take 20 mg by mouth daily before breakfast.      ped multivit #43-iron fumarate (FLINTSTONES COMPLETE, IRON,) 18 mg iron Chew Chew 1 tablet 2 (two) times a day.      VICTOZA 3-ALEXANDER 0.6 mg/0.1 mL (18 mg/3 mL) PnIj injection Inject 1.8 mg under the skin daily.        Past Surgical History  He has a past surgical history that includes Laparoscopic gastric banding (06/01/2009); Appendectomy; lap band removal; Carpal tunnel release (Left); rt shoulder surgery; right knee scope; and pr lap gastric bypass/higinio-en-y (N/A, 9/6/2017).     Examination   /68 (Patient Site: Right Arm, Patient Position: Sitting, Cuff Size: Adult Large)  Ht 6' 2\" (1.88 m)  Wt (!) 320 lb 3.2 oz (145.2 kg)  BMI 41.11 kg/m2  Height: 6' 2\" (1.88 m) (4/25/2018  3:00 PM)  Initial Weight: 384 lbs (4/25/2018  3:00 PM)  Weight: 320 lb 3.2 oz (145.2 kg) (4/25/2018  3:00 PM)  Weight loss from initial: 63.8 (4/25/2018  3:00 PM)  % Weight loss: 16.61 % (4/25/2018  3:00 PM)  BMI (Calculated): 41.1 (4/25/2018  3:00 PM)  SpO2: 100 % (9/19/2017  9:20 AM)  General:  Alert and ambulatory,   HEENT:  No conjunctival pallor, moist mucous Membranes, neck is thin.  Pulmonary:  Normal respiratory effort, no cough, no audible wheezes/crackles.  CV:  Regular rate and Rhythm, no murmurs, pulses 2 plus  Abdominal: soft, non tender. Incisions well healed.  Extremities: right foot without redness. Ankle edema/sock edema present bilaterally. Non tender over the malleoli and 5th MTP. Sole of the foot shows no blisters or signs of foreign body to palpation.  Skin:  Warm/dry without " pallor.  Pscyh/Mood: seems less concerned about reduced success compared to average.         Counseling:   We reviewed the important post op bariatric recommendations:  -eating 3 meals daily  -eating protein first, getting >60gm protein daily  -eating slowly, chewing food well  -avoiding/limiting calorie containing beverages  -drinking water 15-30 minutes before or after meals  -limiting restaurant or cafeteria eating to twice a week or less    We discussed the importance of restorative sleep and stress management in maintaining a healthy weight.  We discussed the National Weight Control Registry healthy weight maintenance strategies and ways to optimize metabolism.  We discussed the importance of physical activity including cardiovascular and strength training in maintaining a healthier weight.  We discussed the importance of life-long vitamin supplementation and life-long  follow-up.    Patricio was reminded that, to avoid marginal ulcers he should avoid tobacco at all, alcohol in excess, caffeine in excess, and NSAIDS (unless indicated for cardioprotection or othewise and opposed by a PPI).    At least 25 minutes was spent in direct consultation and over 50% of the time devoted to counseling regarding maximizing the benefits of his previous bariatric surgery while minimizing risks of nutritional or structural complications.    Asaf Adams MD  [facility].  4/25/2018  3:31 PM

## 2021-06-18 NOTE — PROGRESS NOTES
"Post-op Surgical Weight Loss Diet Evaluation     Assessment:  Pt presents for 9-month post-op RD visit, s/p RNY on 9/6/2017 with Dr. Wade. Today we reviewed current eating habits and level of physical activity, and instructed on the changes that are required for successful bariatric outcomes.    Patient Progress: Pt states he is happy with results thus far but states he has more weight to lose  Goal 270lbs    Pt's Initial Weight: 384 lbs  Weight: 318 lb (144.2 kg)  Weight loss from initial: 66  % Weight loss: 17.19 %    Body mass index is 40.83 kg/(m^2).     Concerns: Pt is still experiencing high BS. Pt stated he doesn't tolerate ice cream but will still eat in. He is eating large portions and snacking on CHOs throughout the day. Pt reports drinking alcohol monthly and has no physical activity regimen  -pt did not want to set any goals or change any habits at this time    Diabetes  Testing Blood Sugars:  high   Last A1C, (per pt. report): 7.7%  DM Meds currently taking: in chart     Vitamins   Multi Vit with Iron: yes - 3X/day  Calcium Citrate: yes  B12: yes  D3: yes - 50,000 2X/week     Do you experience hunger? No  Do you have \"dumping\" syndrome? Yes   How often?ice cream - 2X/month (stil eating)   Do you experience any reflux or discomfort with eating? No  Nausea: no  Vomiting: no  Diarrhea: no  Constipation: no  Hair loss:yes    Diet Recall/Time: wakes 440am  Breakfast: premier protein and banana OR cereal w/ splenda (0-30g)   Am Snack: cheese (8g)   Lunch: chili OR lasagna or sandwich (25g) OR leftovers   Pm snack: pop corn   Dinner: Pro/Veg/CHO (21g)   HS Snack: cheese    Typical Snacks: above    Proteins/Veg/Fruits/CHO (NOT well tolerated): fruit    Estimated protein intake: 60-90 grams    Estimated portion size per meals:1 plus cup/meal    Incorporation of vegetables, fruits, carbohydrates into diet/meals using   Bariatric \"Plate Method\"   The patient and I discussed the importance of including " lean/low fat protein at each meal, including a vegetable/fruit, and limiting carbohydrate intake to less than 25% of plate volume. Always keeping within approved perimeters of post op meal portion sizes according to 9 months post op guidelines.    Healthy Fats: olive oil     Meals per week away from home: 1  Recommended limiting eating out to no more than 2x/week.    Meal Duration:30 minutes  Encouraged slowing meal times down, 20-30 minutes, chewing to applesauce consistency.     Fluid-meal separation:  Fluids are  30min before and 30 minutes after meals.  The patient and I reviewed the anatomy of the bypass and why  fluids from a meal is so important.    Fluid Intake  Water: 100plus   Caffeine: none  Alcohol: 1 every two weeks  Carbonation: none  Milk: occasional   Juice: none    Discussed the importance of adequate hydration after surgery and the goal of 64+ oz of fluid daily.   The patient understands the importance of avoiding all carbonated, caffeinated, and sweetened drinks; and instead choosing 64oz plain water.    Exercise  ADL    Pt's understands that 30-60 minutes of daily activity is an important part of bariatric surgery success.   Encouraged pt to incorporate strength training exercise along with cardiovascular exercise as well, most days of the week.      PES statement:    1. (NB-1.7) Undesirable food choices related to Failure to adjust for lifestyle changes as evidenced by large portions; frequent grazing;  mindless eating ; drinking with meals; frequent restaurant intake; and no structured activity regimen    2. (NC-1.4) Altered GI Function related to Alteration in gastrointestinal tract structure and/or function/ Decreased functional length of the GI tract as evidenced by Weight loss of 17.19% initial body weight; Gastric bypass surgery    Intervention    Discussion  1. Discussed 9-month  Post-Op Nutritional Guidelines for RNY  2. Recommended to consume 15-20gm protein at 3  "meals daily, along with protein supplement/\"planned protein containing snack\" of 15-30gm protein, to reach goal of 60-80 gm protein daily.  3. Educated on post-op vitamin regimen: Multi Vit + iron 2x/day, calcium citrate 400-600 mg 2x/day, 4316-9833 mcg of Sublingual B-12 daily, and 5000 IU Vitamin D3 daily (MVI and calcium can be taken at the same time BID)  4. Reviewed lean protein sources  5. Bariatric Plate Method-  including lean/low fat protein at each meal, including a vegetable/fruit, and limiting carbohydrate intake to less than 25% of plate volume. Approved perimeters of post op meal portion sizes according to 9 months post op guidelines.  Instructions  1. Include 15-20gm protein at each meal, along with protein supplement/\"planned protein containing snack\" of 15-30gm protein, to reach goal of 60-80 gm protein daily.  2. Increase fluid intake to 64oz daily: choose plain or calorie/carbonation-free beverages.  3. Incorporate daily structured activity, 30-60 minutes most days of the week  4. Recommended pt to start taking: Multi Vit + iron 2x/day, calcium citrate 400-600 mg 2x/day, 8896-4631 mcg of Sublingual B-12 daily, and 5000 IU Vitamin D3 daily. (MVI and calcium can be taken at the same time)  5. Read food labels more consistently: keeping total fat grams <10, total sugar grams <10, fiber >3gm per serving.  6. Increase vegetable/fruit intake, by having a vegetable or fruit with each meal daily.  7. Practice plate method: 1/2 plate lean/low fat protein source, vegetable/fruit, <25% of plate complex carbohydrates.  8. Separate fluids 30 minutes before/after meal times.  9. Practice eating off of smaller plates/bowls, chewing to applesauce consistency, taking 20-30 minutes to eat in a calm/relaxed environment without distractions of tv/email/cell phone.    Handouts provided:  9-month  Post-Op Nutritional Guidelines for RNY  Lean Protein Sources    Monitor/Evaluation    Pt to follow up for 1 year  post-op " visit with bariatrician - scheduled    Time In: 9:30a  Time Out: 10:00a    ABN signed: Yes

## 2021-06-20 NOTE — PROGRESS NOTES
12 mos post op lab orders placed for patient in preparation for appointment with  in September.    Nellie Marcano RN, [facility]  P [phone]  F [phone]

## 2022-07-04 ENCOUNTER — HOSPITAL ENCOUNTER (EMERGENCY)
Facility: HOSPITAL | Age: 62
Discharge: HOME | End: 2022-07-04
Attending: EMERGENCY MEDICINE | Admitting: EMERGENCY MEDICINE
Payer: COMMERCIAL

## 2022-07-04 VITALS
TEMPERATURE: 98.1 F | SYSTOLIC BLOOD PRESSURE: 153 MMHG | OXYGEN SATURATION: 95 % | HEART RATE: 68 BPM | RESPIRATION RATE: 16 BRPM | BODY MASS INDEX: 38.52 KG/M2 | DIASTOLIC BLOOD PRESSURE: 81 MMHG | WEIGHT: 300 LBS

## 2022-07-04 DIAGNOSIS — H16.133 PHOTOKERATITIS OF BOTH EYES: ICD-10-CM

## 2022-07-04 PROCEDURE — 250N000009 HC RX 250: Performed by: EMERGENCY MEDICINE

## 2022-07-04 PROCEDURE — 250N000013 HC RX MED GY IP 250 OP 250 PS 637: Performed by: EMERGENCY MEDICINE

## 2022-07-04 PROCEDURE — 250N000009 HC RX 250: Performed by: STUDENT IN AN ORGANIZED HEALTH CARE EDUCATION/TRAINING PROGRAM

## 2022-07-04 PROCEDURE — 99283 EMERGENCY DEPT VISIT LOW MDM: CPT

## 2022-07-04 RX ORDER — OXYCODONE HYDROCHLORIDE 5 MG/1
5 TABLET ORAL EVERY 6 HOURS PRN
Qty: 8 TABLET | Refills: 0 | Status: SHIPPED | OUTPATIENT
Start: 2022-07-04 | End: 2022-07-06

## 2022-07-04 RX ORDER — TETRACAINE HYDROCHLORIDE 5 MG/ML
1 SOLUTION OPHTHALMIC ONCE
Status: COMPLETED | OUTPATIENT
Start: 2022-07-04 | End: 2022-07-04

## 2022-07-04 RX ORDER — NAPROXEN 250 MG/1
500 TABLET ORAL ONCE
Status: COMPLETED | OUTPATIENT
Start: 2022-07-04 | End: 2022-07-04

## 2022-07-04 RX ORDER — OXYCODONE HYDROCHLORIDE 5 MG/1
5 TABLET ORAL ONCE
Status: DISCONTINUED | OUTPATIENT
Start: 2022-07-04 | End: 2022-07-04

## 2022-07-04 RX ORDER — ERYTHROMYCIN 5 MG/G
OINTMENT OPHTHALMIC ONCE
Status: COMPLETED | OUTPATIENT
Start: 2022-07-04 | End: 2022-07-04

## 2022-07-04 RX ORDER — ERYTHROMYCIN 5 MG/G
0.5 OINTMENT OPHTHALMIC 3 TIMES DAILY
Qty: 7 G | Refills: 0 | Status: SHIPPED | OUTPATIENT
Start: 2022-07-04 | End: 2022-07-09

## 2022-07-04 RX ORDER — ACETAMINOPHEN 325 MG/1
975 TABLET ORAL ONCE
Status: COMPLETED | OUTPATIENT
Start: 2022-07-04 | End: 2022-07-04

## 2022-07-04 RX ADMIN — FLUORESCEIN SODIUM 1 STRIP: 1 STRIP OPHTHALMIC at 03:04

## 2022-07-04 RX ADMIN — NAPROXEN 500 MG: 250 TABLET ORAL at 02:54

## 2022-07-04 RX ADMIN — ERYTHROMYCIN 1 G: 5 OINTMENT OPHTHALMIC at 02:54

## 2022-07-04 RX ADMIN — TETRACAINE HYDROCHLORIDE 1 DROP: 5 SOLUTION OPHTHALMIC at 02:17

## 2022-07-04 RX ADMIN — ACETAMINOPHEN 975 MG: 325 TABLET ORAL at 02:54

## 2022-07-04 ASSESSMENT — VISUAL ACUITY
OD: 20/70
OU: NORMAL
OS: 20/70

## 2022-07-04 ASSESSMENT — ENCOUNTER SYMPTOMS: EYE PAIN: 1

## 2022-07-04 NOTE — ED PROVIDER NOTES
EMERGENCY DEPARTMENT ENCOUNTER      NAME: Patricio Osei  AGE: 62 year old male  YOB: 1960  MRN: [id number]  EVALUATION DATE & TIME: No admission date for patient encounter.    PCP: Felix Mcclain    ED PROVIDER: Kita Her M.D.      Chief Complaint   Patient presents with     Eye Injury         FINAL IMPRESSION:  1. Photokeratitis of both eyes        MEDICAL DECISION MAKIN:24 AM I met with the patient, obtained history, performed an initial exam, and discussed options and plan for diagnostics and treatment here in the ED. PPE worn: cloth mask, surgical mask, eye protection and nitrile gloves.    Pertinent Labs & Imaging studies reviewed. (See chart for details)     Patricio Osei is a 62 year old male who presents with bilateral red eye and pain.  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  Welders mask did not fit appropriately so eyes were exposed to the light.  Examined the eyes under fluorescein with black light.  Exam and history are consistent with photo keratitis from welding.  Visual acuity 20/70 in both eyes.  He does wear glasses but does not have them on tonight.  No contacts.  He will be discharged with erythromycin ointment, pain medications and instructions to follow-up on Tuesday or Wednesday with ophthalmologist for repeat examination of his eyes.       MEDICATIONS GIVEN IN THE EMERGENCY:  Medications   fluorescein (FUL-AJCK) ophthalmic strip 1 strip (has no administration in time range)   naproxen (NAPROSYN) tablet 500 mg (has no administration in time range)   acetaminophen (TYLENOL) tablet 975 mg (has no administration in time range)   erythromycin (ROMYCIN) ophthalmic ointment (has no administration in time range)   tetracaine (PONTOCAINE) 0.5 % ophthalmic solution 1 drop (1 drop Both Eyes Given 22 0217)       NEW PRESCRIPTIONS STARTED AT TODAY'S ER VISIT:  New Prescriptions    No medications on file           =================================================================    HPI    Patient information was obtained from: patient     Use of : Use of : N/A       Patricio Osei is a 62 year old male with a history of T2DM, hypertension, OTILIA, hyperlipidemia, who presents with eye injury.    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 He tried taking 600MG Ibuprofen and Tylenol 1000 MG at midnight with no relief which prompted him to come into ED. He also tried using eye drops with no relief. Currently, he feels better with the numbing eye drops given in ED. He does wear glasses at baseline. No known allergies to medications.     REVIEW OF SYSTEMS   Review of Systems   Eyes: Positive for pain (bilateral) and visual disturbance (bilateral).   All other systems reviewed and are negative.       PAST MEDICAL HISTORY:  History reviewed. No pertinent past medical history.    PAST SURGICAL HISTORY:  Past Surgical History:   Procedure Laterality Date     APPENDECTOMY       LAPAROSCOPIC GASTRIC BANDING  06/01/2009    Dr. Ayala; removed 3/2015     OTHER SURGICAL HISTORY      lap band removalremoved March, 2016.     OTHER SURGICAL HISTORY      rt shoulder surgery     OTHER SURGICAL HISTORY      right knee scope     CA LAP GASTRIC BYPASS/DELANEY-EN-Y N/A 9/6/2017    Procedure: LAPAROSCOPIC DELANEY-EN-Y GASTRIC BYPASS;  Surgeon: Isaac Wade MD;  Location: [facility];  Service: General     RELEASE CARPAL TUNNEL Left        CURRENT MEDICATIONS:      Current Facility-Administered Medications:      acetaminophen (TYLENOL) tablet 975 mg, 975 mg, Oral, Once, Kita Her MD     erythromycin (ROMYCIN) ophthalmic ointment, , Both Eyes, Once, Kita Her MD     fluorescein (FUL-JACK) ophthalmic strip 1 strip, 1 strip, Both Eyes, Once, Kita Her MD     naproxen (NAPROSYN)  tablet 500 mg, 500 mg, Oral, Once, Kita Her MD    Current Outpatient Medications:      ACCU-CHEK CHANTELLE PLUS TEST STRP strips, [ACCU-CHEK CHANTELLE PLUS TEST STRP STRIPS] , Disp: , Rfl:      albuterol (PROVENTIL HFA;VENTOLIN HFA) 90 mcg/actuation inhaler, [ALBUTEROL (PROVENTIL HFA;VENTOLIN HFA) 90 MCG/ACTUATION INHALER] Inhale 1-2 puffs every 6 (six) hours as needed for wheezing or shortness of breath. , Disp: , Rfl:      atorvastatin (LIPITOR) 40 MG tablet, [ATORVASTATIN (LIPITOR) 40 MG TABLET] Take 40 mg by mouth daily., Disp: , Rfl:      cyanocobalamin, vitamin B-12, 1,000 mcg Subl, [CYANOCOBALAMIN, VITAMIN B-12, 1,000 MCG SUBL] Place 1,000 mcg under the tongue daily., Disp: , Rfl:      ergocalciferol (VITAMIN D2) 50,000 unit capsule, [ERGOCALCIFEROL (VITAMIN D2) 50,000 UNIT CAPSULE] Take 1 capsule (50,000 Units total) by mouth 2 (two) times a week., Disp: 16 capsule, Rfl: 0     LANTUS SOLOSTAR 100 unit/mL (3 mL) pen, [LANTUS SOLOSTAR 100 UNIT/ML (3 ML) PEN] Inject 20 Units under the skin at bedtime., Disp: 5 pen, Rfl: 0     lisinopril (PRINIVIL,ZESTRIL) 20 MG tablet, [LISINOPRIL (PRINIVIL,ZESTRIL) 20 MG TABLET] Take 20 mg by mouth daily. , Disp: , Rfl:      metFORMIN (GLUCOPHAGE) 1000 MG tablet, [METFORMIN (GLUCOPHAGE) 1000 MG TABLET] Take 1 tablet (1,000 mg total) by mouth 2 (two) times a day with meals., Disp: , Rfl: 0     NOVOLOG FLEXPEN 100 unit/mL injection pen, [NOVOLOG FLEXPEN 100 UNIT/ML INJECTION PEN] Subcutaneous, 3 times daily with meals. Correction Insulin., Disp: 5, Rfl: 0     omeprazole (PRILOSEC) 20 MG capsule, [OMEPRAZOLE (PRILOSEC) 20 MG CAPSULE] Take 20 mg by mouth daily before breakfast. , Disp: , Rfl:      ped multivit #43-iron fumarate (FLINTSTONES COMPLETE, IRON,) 18 mg iron Chew, [PED MULTIVIT #43-IRON FUMARATE (FLINTSTONES COMPLETE, IRON,) 18 MG IRON CHEW] Chew 1 tablet 2 (two) times a day. , Disp: , Rfl:      VICTOZA 3-ALEXANDER 0.6 mg/0.1 mL (18 mg/3 mL) PnIj injection, [VICTOZA 3-ALEXANDER  0.6 MG/0.1 ML (18 MG/3 ML) PNIJ INJECTION] Inject 1.8 mg under the skin daily. , Disp: , Rfl:     ALLERGIES:  No Known Allergies    FAMILY HISTORY:  Family History   Problem Relation Age of Onset     Rectal Cancer Mother      No Known Problems Brother      Lupus Sister        SOCIAL HISTORY:   Social History     Tobacco Use     Smoking status: Never Smoker     Smokeless tobacco: Never Used   Substance Use Topics     Alcohol use: No     Alcohol/week: 0.0 - 1.0 standard drinks     Comment: Alcoholic Drinks/day: occasionally out with friends may have a few beers.     Drug use: No        PHYSICAL EXAM:    Vitals: BP (!) 153/81   Pulse 68   Temp 98.1  F (36.7  C) (Oral)   Resp 16   Wt 136.1 kg (300 lb)   SpO2 95%   BMI 38.52 kg/m     General:. Alert and interactive, comfortable appearing.  HENT: Oropharynx without erythema or exudates. MMM.  TMs clear bilaterally.  Eyes: Pupils mid-sized and equally reactive. Bilateral keratitis with diffuse uptake of fluorescene. No focal corneal abrasion or defect.  Visual acuity 20/70 both eyes.  Neck: Full AROM.  No midline tenderness to palpation.  Cardiovascular: Regular rate and rhythm. Peripheral pulses 2+ bilaterally.  Chest/Pulmonary: Normal work of breathing. Lung sounds clear and equal throughout, no wheezes or crackles. No chest wall tenderness or deformities.  Abdomen: Soft, nondistended. Nontender without guarding or rebound.  Back/Spine: No CVA or midline tenderness.  Extremities: Normal ROM of all major joints. No lower extremity edema.   Skin: Warm and dry. Normal skin color.   Neuro: Speech clear. CNs grossly intact. Moves all extremities appropriately. Strength and sensation grossly intact to all extremities.   Psych: Normal affect/mood, cooperative, memory appropriate.       IVinita, am serving as a scribe to document services personally performed by Dr. Kita Her  based on my observation and the provider's statements to me. Kita JACOBSEN MD  attest that Vinita Farah is acting in a scribe capacity, has observed my performance of the services and has documented them in accordance with my direction.      Kita Her M.D.  Emergency Medicine  [facility] EMERGENCY DEPARTMENT  [address] [place] [id number] [phone]  Dept: [phone]         Kita Her MD  07/04/22 0337

## 2022-07-04 NOTE — ED TRIAGE NOTES
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  Pt took Ibuprofen 600 mg and Tylenol 1000 mg at midnight with no relief.  Pt also used eye drops with no relief.  Pt is having difficulty opening his eyes and seeing.     Triage Assessment     Row Name 07/04/22 0214       Triage Assessment (Adult)    Airway WDL WDL       Respiratory WDL    Respiratory WDL WDL       Skin Circulation/Temperature WDL    Skin Circulation/Temperature WDL WDL       Cardiac WDL    Cardiac WDL WDL       Peripheral/Neurovascular WDL    Peripheral Neurovascular WDL WDL       Cognitive/Neuro/Behavioral WDL    Cognitive/Neuro/Behavioral WDL WDL              "

## 2022-07-04 NOTE — DISCHARGE INSTRUCTIONS
Apply half an inch strip of the erythromycin ointment to the lower lid three times daily.  Use naproxen 500 mg every 12 hours and Tylenol 1000 mg every 6 hours to help with pain.  For very severe pain you may use oxycodone 5 mg every 6 hours.  Follow-up with your ophthalmologist on Wednesday for repeat examination.

## 2025-06-11 NOTE — PROGRESS NOTES
HPI: Pt is here for follow up of a laparoscopic Mike Y gastric bypass. He is doing well. Taking po well. No vomiting. No fevers or chills. Ambulating without problems. His sugars have been running in the 140 range. He has stopped his Metformin as the pill is too difficult to take.      Current Outpatient Prescriptions   Medication Sig Dispense Refill     ACCU-CHEK CHANTELLE PLUS TEST STRP strips        albuterol (PROVENTIL HFA;VENTOLIN HFA) 90 mcg/actuation inhaler Inhale 1-2 puffs every 6 (six) hours as needed for wheezing or shortness of breath.        atorvastatin (LIPITOR) 40 MG tablet Take 40 mg by mouth daily.       cyanocobalamin, vitamin B-12, 1,000 mcg Subl Place 1,000 mcg under the tongue daily.       HYDROmorphone (DILAUDID) 2 MG tablet Take 1-2 tablets (2-4 mg total) by mouth every 4 (four) hours as needed (moderate pain (4-6) give 2 mg, severe pain (7-10) give 4 mg). 20 tablet 0     LANTUS SOLOSTAR 100 unit/mL (3 mL) pen Inject 20 Units under the skin at bedtime. 5 adj dose pen 0     lisinopril (PRINIVIL,ZESTRIL) 20 MG tablet Take 20 mg by mouth daily.        metFORMIN (GLUCOPHAGE) 1000 MG tablet Take 1 tablet (1,000 mg total) by mouth 2 (two) times a day with meals.  0     NOVOLOG FLEXPEN 100 unit/mL injection pen Subcutaneous, 3 times daily with meals. Correction Insulin. 5 Pre-filled Pen Syringe 0     omeprazole (PRILOSEC) 20 MG capsule Take 20 mg by mouth daily before breakfast.        ped multivit #43-iron fumarate (FLINTSTONES COMPLETE, IRON,) 18 mg iron Chew Chew 1 tablet 2 (two) times a day.        [START ON 9/20/2017] ursodiol (ACTIGALL) 300 mg capsule Take 1 capsule (300 mg total) by mouth 2 (two) times a day. Start 2 wks after surgery. Do not cut, crush or open. Take with warm liquids. 180 capsule 1     VICTOZA 3-ALEXANDER 0.6 mg/0.1 mL (18 mg/3 mL) PnIj injection Inject 1.8 mg under the skin daily.        No current facility-administered medications for this visit.          /65  Pulse 68  Resp  Per Dr. Menjivar, \"Continue to monitor this.  I think we should try to keep him from having to go into the hospital unless he has really sustained shortness of breath or sustained hypoxia.\"    Priti made aware of recommendations per Dr. Menjivar.   "18  Ht 6' 2\" (1.88 m)  Wt (!) 337 lb (152.9 kg)  SpO2 100%  BMI 43.27 kg/m2  Wt Readings from Last 3 Encounters:   09/19/17 (!) 337 lb (152.9 kg)   09/06/17 (!) 343 lb 2 oz (155.6 kg)   08/17/17 (!) 378 lb (171.5 kg)     Body mass index is 43.27 kg/(m^2).    EXAM:  GENERAL:Appears well  ABDOMEN: Incisions healing well      Assessment/Plan: Pt s/p laparoscopic gastric bypass. Doing well. Diet and activity discussed. [facility].    Isaac Wade MD  [facility] Department of Surgery  "